# Patient Record
Sex: MALE
[De-identification: names, ages, dates, MRNs, and addresses within clinical notes are randomized per-mention and may not be internally consistent; named-entity substitution may affect disease eponyms.]

---

## 2024-03-15 PROBLEM — Z00.00 ENCOUNTER FOR PREVENTIVE HEALTH EXAMINATION: Status: ACTIVE | Noted: 2024-03-15

## 2024-03-18 ENCOUNTER — APPOINTMENT (OUTPATIENT)
Dept: ORTHOPEDIC SURGERY | Facility: CLINIC | Age: 29
End: 2024-03-18
Payer: COMMERCIAL

## 2024-03-18 VITALS — BODY MASS INDEX: 28.12 KG/M2 | WEIGHT: 175 LBS | HEIGHT: 66 IN

## 2024-03-18 DIAGNOSIS — Z78.9 OTHER SPECIFIED HEALTH STATUS: ICD-10-CM

## 2024-03-18 PROCEDURE — 99203 OFFICE O/P NEW LOW 30 MIN: CPT

## 2024-03-18 PROCEDURE — 73110 X-RAY EXAM OF WRIST: CPT | Mod: LT

## 2024-03-18 NOTE — HISTORY OF PRESENT ILLNESS
[Intermittent] : intermittent [de-identified] : 3/18/24: 29yo ambidextrous male (, ) presents for LEFT ulnar wrist pain after a FOOSH while skateboarding ~6 months and again ~2 months ago. Not currently taking any medication for this.  Hx: none. [] : no [FreeTextEntry1] : LEFT wrist  [FreeTextEntry5] : CHERELLE NATALI an [AMBIDEXTROUS, L>R] 28 year old male is here today c/o LEFT wrist pain x 6 months ago after falling while skateboarding. states he reinjured wrist after falling 2 months ago. c/o weakness and ulnar wrist pain with twisting wrist. not taking anthing for pain.

## 2024-03-18 NOTE — IMAGING
[de-identified] : LEFT HAND skin intact. no swelling. no TTP. wrist ROM: good extension, flexion. good pronation, supination. good EPL, FPL. good finger extension, flex to full fist. good finger abduction and adduction.  SILT to median, ulnar, radial distribution.  palpable radial pulse, brisk cap refill all digits. no triggering.  + pain with supination. DRUJ shear => no pain. no instability. negative [facility] synergy test.   XRAYS OF LEFT WRIST: no acute displaced fracture or dislocation. calcification dorsal to carpus.

## 2024-03-18 NOTE — ASSESSMENT
[FreeTextEntry1] : The condition was explained to the patient. - recommend wrist brace, full time except hygiene. - activity modification: limit lifting, pushing, pulling, twisting.  F/u 3 weeks.

## 2024-04-11 ENCOUNTER — APPOINTMENT (OUTPATIENT)
Dept: ORTHOPEDIC SURGERY | Facility: CLINIC | Age: 29
End: 2024-04-11
Payer: COMMERCIAL

## 2024-04-11 DIAGNOSIS — S69.82XA OTHER SPECIFIED INJURIES OF LEFT WRIST, HAND AND FINGER(S), INITIAL ENCOUNTER: ICD-10-CM

## 2024-04-11 PROCEDURE — 20605 DRAIN/INJ JOINT/BURSA W/O US: CPT | Mod: LT

## 2024-04-11 PROCEDURE — 99214 OFFICE O/P EST MOD 30 MIN: CPT | Mod: 25

## 2024-04-11 NOTE — HISTORY OF PRESENT ILLNESS
[de-identified] : 4/11/2024: f/u LEFT TFCC tear. wearing wrist widget, which helps a lot.   3/18/24: 29yo ambidextrous male (, ) presents for LEFT ulnar wrist pain after a FOOSH while skateboarding ~6 months and again ~2 months ago. Not currently taking any medication for this.  Hx: none. [FreeTextEntry1] : LEFT wrist  [FreeTextEntry5] : CHERELLE is here today to follow up on his LEFT wrist. pt states since last visit, pain decreased.

## 2024-04-11 NOTE — IMAGING
[de-identified] : LEFT HAND skin intact. no swelling. min TTP to fovea. wrist ROM: good extension, flexion. good pronation, supination. good EPL, FPL. good finger extension, flex to full fist. good finger abduction and adduction.  SILT to median, ulnar, radial distribution.  palpable radial pulse, brisk cap refill all digits. no triggering.  + pain with hypersupination. DRUJ shear => + mild pain @pronation. no instability. negative ECU synergy test.

## 2024-04-11 NOTE — ASSESSMENT
[FreeTextEntry1] : Patient would like to proceed with CSI for pain relief. Discussed risk of cartilage damage with CSI, particularly with repeated injections. - Discussed risks, benefits, and alternatives as well as contents of injection. Risks include, but are not limited allergic reaction, flare reaction, injection site pain, bruising, numbness, increased blood sugar, skin discoloration, fat atrophy, tendon rupture, and infection. Risk of immune suppression and increased susceptibility to infection with steroid use. Patient expressed understanding and would like to proceed with injection. - The skin over the LEFT ulnocarpal joint was cleansed with alcohol/betadine and anesthetized with ethyl chloride and 1cc of 1% lidocaine. The joint was injected with 6mg of celestone. Site was dressed with a band-aid. Patient tolerated the procedure well.  - wrist widget PRN activity. - pain guided activity modification.  F/u PRN.